# Patient Record
(demographics unavailable — no encounter records)

---

## 2024-10-09 NOTE — PHYSICAL EXAM
[Right] : right foot and ankle [NL (40)] : plantar flexion 40 degrees [4___] : plantar flexion 4[unfilled]/5 [2+] : posterior tibialis pulse: 2+ [Normal] : saphenous nerve sensation normal [Mild] : mild diffused ankle swelling [NL (20)] : dorsiflexion 20 degrees [5___] : dorsiflexion 5[unfilled]/5 [] : patient ambulates without assistive device [de-identified] : inversion 20 degrees

## 2024-10-09 NOTE — HISTORY OF PRESENT ILLNESS
[4] : 4 [0] : 0 [Dull/Aching] : dull/aching [de-identified] : Patient returns s/p right achilles tendon repair from 7/2/24.  He has been wb in [employer].  He has been going to PT.  He reports doing well overall, but still has some soreness/weakness. [] : no [FreeTextEntry1] : right ankle.

## 2024-10-09 NOTE — DISCUSSION/SUMMARY
[de-identified] : Patient is doing well and continues to improve. He will continue with PT and home exercises. No high impact activities. He should still avoid ladders at work.

## 2024-11-20 NOTE — PHYSICAL EXAM
[Right] : right foot and ankle [Mild] : mild diffused ankle swelling [NL (40)] : plantar flexion 40 degrees [NL (20)] : eversion 20 degrees [4___] : plantar flexion 4[unfilled]/5 [5___] : eversion 5[unfilled]/5 [2+] : posterior tibialis pulse: 2+ [Normal] : saphenous nerve sensation normal [NL 30)] : inversion 30 degrees [] : non-antalgic [FreeTextEntry8] : Thickening Achilles tendon.

## 2024-11-20 NOTE — HISTORY OF PRESENT ILLNESS
[4] : 4 [0] : 0 [Dull/Aching] : dull/aching [de-identified] : Patient returns s/p right achilles tendon repair from 7/2/24. He has been wb in [employer]. He reports doing well overall, but still has some soreness/weakness. He has been doing HEP. Has been very busy with work and unable to attend PT.   [] : no [FreeTextEntry1] : right ankle.

## 2024-11-20 NOTE — DISCUSSION/SUMMARY
[de-identified] : Patient is doing well and continues to improve. He will continue with PT and home exercises. No high impact activities.

## 2025-01-15 NOTE — HISTORY OF PRESENT ILLNESS
[4] : 4 [0] : 0 [Dull/Aching] : dull/aching [de-identified] : Patient returns s/p right achilles tendon repair from 7/2/24. He has been wb in [employer]. He reports continued improvement. He has been doing HEP.  [] : no [FreeTextEntry1] : right ankle.

## 2025-01-15 NOTE — PHYSICAL EXAM
[Right] : right foot and ankle [Mild] : mild diffused ankle swelling [NL (40)] : plantar flexion 40 degrees [NL 30)] : inversion 30 degrees [NL (20)] : eversion 20 degrees [4___] : plantar flexion 4[unfilled]/5 [5___] : eversion 5[unfilled]/5 [2+] : posterior tibialis pulse: 2+ [Normal] : saphenous nerve sensation normal [] : non-antalgic [FreeTextEntry8] : Thickening Achilles tendon.

## 2025-02-26 NOTE — HISTORY OF PRESENT ILLNESS
[4] : 4 [0] : 0 [Dull/Aching] : dull/aching [de-identified] : Patient returns s/p right achilles tendon repair from 7/2/24. He has been wb in [employer]. He has been going to PT and has been doing home exercises.  He feels that he still has some weakness, but states he has plateaued in PT. [] : no [FreeTextEntry1] : right ankle.

## 2025-02-26 NOTE — DISCUSSION/SUMMARY
[de-identified] : Patient continues to make progress. He will continue with home exercises. We discussed going back to PT to focus on strengthening and endurance.  Physical therapy was prescribed for 2-3 times per week for four weeks.

## 2025-02-26 NOTE — PHYSICAL EXAM
[Right] : right foot and ankle [NL (40)] : plantar flexion 40 degrees [NL 30)] : inversion 30 degrees [NL (20)] : eversion 20 degrees [4___] : plantar flexion 4[unfilled]/5 [5___] : eversion 5[unfilled]/5 [2+] : posterior tibialis pulse: 2+ [Normal] : saphenous nerve sensation normal [] : non-antalgic [FreeTextEntry8] : Thickening Achilles tendon.

## 2025-05-14 NOTE — PHYSICAL EXAM
[Right] : right foot and ankle [NL (40)] : plantar flexion 40 degrees [NL 30)] : inversion 30 degrees [NL (20)] : eversion 20 degrees [2+] : posterior tibialis pulse: 2+ [Normal] : saphenous nerve sensation normal [5___] : plantar flexion 5[unfilled]/5 [] : non-antalgic [FreeTextEntry8] : Thickening Achilles tendon.

## 2025-05-14 NOTE — DISCUSSION/SUMMARY
[de-identified] : Patient continues to make progress. He will continue with home exercises. Physical therapy was discussed, he declines.  He can increase activities as tolerated.

## 2025-05-14 NOTE — HISTORY OF PRESENT ILLNESS
[4] : 4 [0] : 0 [Dull/Aching] : dull/aching [de-identified] : Patient returns s/p right achilles tendon repair from 7/2/24. He has been wb in [employer]. He completed PT and is doing HEP. He reports doing well. He was able to ski in [place] without issue.  [] : no [FreeTextEntry1] : right ankle.